# Patient Record
Sex: MALE | Race: WHITE | URBAN - METROPOLITAN AREA
[De-identification: names, ages, dates, MRNs, and addresses within clinical notes are randomized per-mention and may not be internally consistent; named-entity substitution may affect disease eponyms.]

---

## 2017-01-16 ENCOUNTER — APPOINTMENT (RX ONLY)
Dept: URBAN - METROPOLITAN AREA CLINIC 124 | Facility: CLINIC | Age: 82
Setting detail: DERMATOLOGY
End: 2017-01-16

## 2017-01-16 VITALS — WEIGHT: 156 LBS

## 2017-01-16 DIAGNOSIS — L29.89 OTHER PRURITUS: ICD-10-CM

## 2017-01-16 DIAGNOSIS — D485 NEOPLASM OF UNCERTAIN BEHAVIOR OF SKIN: ICD-10-CM

## 2017-01-16 DIAGNOSIS — L13.0 DERMATITIS HERPETIFORMIS: ICD-10-CM

## 2017-01-16 DIAGNOSIS — R23.1 PALLOR: ICD-10-CM

## 2017-01-16 DIAGNOSIS — L43.8 OTHER LICHEN PLANUS: ICD-10-CM

## 2017-01-16 PROBLEM — D48.5 NEOPLASM OF UNCERTAIN BEHAVIOR OF SKIN: Status: ACTIVE | Noted: 2017-01-16

## 2017-01-16 PROBLEM — L29.8 OTHER PRURITUS: Status: ACTIVE | Noted: 2017-01-16

## 2017-01-16 PROBLEM — M12.9 ARTHROPATHY, UNSPECIFIED: Status: ACTIVE | Noted: 2017-01-16

## 2017-01-16 PROCEDURE — 11100: CPT

## 2017-01-16 PROCEDURE — ? OTHER

## 2017-01-16 PROCEDURE — ? DIAGNOSIS COMMENT

## 2017-01-16 PROCEDURE — ? BIOPSY BY SHAVE METHOD

## 2017-01-16 PROCEDURE — 99203 OFFICE O/P NEW LOW 30 MIN: CPT | Mod: 25

## 2017-01-16 PROCEDURE — ? COUNSELING

## 2017-01-16 PROCEDURE — ? INTRAMUSCULAR KENALOG

## 2017-01-16 PROCEDURE — 96372 THER/PROPH/DIAG INJ SC/IM: CPT | Mod: 59

## 2017-01-16 ASSESSMENT — LOCATION SIMPLE DESCRIPTION DERM
LOCATION SIMPLE: RIGHT KNEE
LOCATION SIMPLE: UPPER BACK
LOCATION SIMPLE: LEFT FOREARM
LOCATION SIMPLE: LEFT BUTTOCK
LOCATION SIMPLE: LEFT PRETIBIAL REGION
LOCATION SIMPLE: RIGHT FOREARM
LOCATION SIMPLE: RIGHT PRETIBIAL REGION
LOCATION SIMPLE: LEFT KNEE
LOCATION SIMPLE: LEFT ELBOW
LOCATION SIMPLE: LEFT UPPER ARM
LOCATION SIMPLE: RIGHT UPPER BACK
LOCATION SIMPLE: RIGHT UPPER ARM
LOCATION SIMPLE: LEFT THIGH
LOCATION SIMPLE: RIGHT THIGH
LOCATION SIMPLE: RIGHT ELBOW

## 2017-01-16 ASSESSMENT — LOCATION DETAILED DESCRIPTION DERM
LOCATION DETAILED: RIGHT PROXIMAL PRETIBIAL REGION
LOCATION DETAILED: RIGHT ELBOW
LOCATION DETAILED: LEFT DISTAL POSTERIOR UPPER ARM
LOCATION DETAILED: RIGHT PROXIMAL DORSAL FOREARM
LOCATION DETAILED: LEFT LATERAL DISTAL PRETIBIAL REGION
LOCATION DETAILED: LEFT ELBOW
LOCATION DETAILED: RIGHT ANTERIOR PROXIMAL THIGH
LOCATION DETAILED: LEFT PROXIMAL DORSAL FOREARM
LOCATION DETAILED: LEFT ANTERIOR PROXIMAL THIGH
LOCATION DETAILED: LEFT KNEE
LOCATION DETAILED: RIGHT KNEE
LOCATION DETAILED: LEFT PROXIMAL PRETIBIAL REGION
LOCATION DETAILED: RIGHT SUPERIOR UPPER BACK
LOCATION DETAILED: RIGHT DISTAL POSTERIOR UPPER ARM
LOCATION DETAILED: LEFT BUTTOCK
LOCATION DETAILED: SUPERIOR THORACIC SPINE

## 2017-01-16 ASSESSMENT — LOCATION ZONE DERM
LOCATION ZONE: TRUNK
LOCATION ZONE: ARM
LOCATION ZONE: LEG

## 2017-01-16 NOTE — PROCEDURE: BIOPSY BY SHAVE METHOD
Detail Level: Simple
Biopsy Method: Double edge Personna blades
Biopsy Type: H and E
Size Of Lesion In Cm: 3
Render Post-Care Instructions In Note?: no
Hemostasis: Aluminum Chloride
Anesthesia Type: 1% lidocaine with 1:100,000 epinephrine and a 1:10 solution of 8.4% sodium bicarbonate
Wound Care: Vaseline
Anesthesia Volume In Cc (Will Not Render If 0): 1.5
Cryotherapy Text: The wound bed was treated with cryotherapy after the biopsy was performed.
Post-Care Instructions: I reviewed with the patient in detail post-care instructions. Patient is to keep the biopsy site dry overnight, and then apply bacitracin twice daily until healed. Patient may apply hydrogen peroxide soaks to remove any crusting.
Type Of Destruction Used: Curettage
Consent: Written consent was obtained and risks were reviewed including but not limited to scarring, infection, bleeding, scabbing, incomplete removal, nerve damage and allergy to anesthesia.
Lab Facility: [address] [facility]
Dressing: pressure dressing with telfa
Electrodesiccation And Curettage Text: The wound bed was treated with electrodesiccation and curettage after the biopsy was performed.
Path Notes (To The Dermatopathologist): Piece of larger lesion
Curettage Text: The wound bed was treated with curettage after the biopsy was performed.
Billing Type: [employer]
X Size Of Lesion In Cm: 2.7
Electrodesiccation Text: The wound bed was treated with electrodesiccation after the biopsy was performed.
Lab: [facility]
Notification Instructions: Patient will be notified of biopsy results. However, patient instructed to call the office if not contacted within 2 weeks.
Silver Nitrate Text: The wound bed was treated with silver nitrate after the biopsy was performed.
Additional Anesthesia Volume In Cc (Will Not Render If 0): 0

## 2017-01-16 NOTE — HPI: RASH (LICHEN PLANUS)
How Severe Is It?: moderate
Is This A New Presentation, Or A Follow-Up?: Rash
Additional History: Patient has been treated with IMK in the past as well as betamethasone cream and mometasone cream. He stated the topicals helped very minimally.

## 2017-01-16 NOTE — PROCEDURE: OTHER
Note Text (......Xxx Chief Complaint.): This diagnosis correlates with the
Detail Level: Generalized
Other (Free Text): -patient currently on dapsone therapy 100mg once daily \\n-maintains a gluten free diet \\n-chronic itching on elbows and knees\\n-managed by Dr. Primo Hunt in [facility], [address] [place] [address] Way is being monitored closely with blood work every 6-7 weeks\\n-will send records request
Other (Free Text): -discussed phototherapy vs IMK vs ILK vs topical treatments \\n-patient has treated with IMK in the past, q6 months \\n-discussed continuing IMK injects and treating more frequently, q6 weeks\\n-will treat with IMK 40mg/cc today and follow up in 6 weeks for an additional injection if needed \\n-photos taken\\n-discussed with patient risks, benefits, and adverse effects \\n-all questions answered

## 2017-01-16 NOTE — PROCEDURE: INTRAMUSCULAR KENALOG
Expiration Date (Optional): 04/2018
Ndc# (Optional): 0340-6744-96
Lot # (Optional): [id number]
Consent: The risks of atrophy were reviewed with the patient.
Concentration (Mg/Ml): 40.0
Total Volume (Ccs): 1
Kenalog Preparation: kenalog
Detail Level: Simple
Administered By (Optional): Edna Shoemaker

## 2017-02-27 ENCOUNTER — APPOINTMENT (RX ONLY)
Dept: URBAN - METROPOLITAN AREA CLINIC 124 | Facility: CLINIC | Age: 82
Setting detail: DERMATOLOGY
End: 2017-02-27

## 2017-02-27 DIAGNOSIS — L13.0 DERMATITIS HERPETIFORMIS: ICD-10-CM | Status: STABLE

## 2017-02-27 DIAGNOSIS — L81.4 OTHER MELANIN HYPERPIGMENTATION: ICD-10-CM

## 2017-02-27 DIAGNOSIS — S31000A OPEN WOUND(S) (MULTIPLE) OF UNSPECIFIED SITE(S), WITHOUT MENTION OF COMPLICATION: ICD-10-CM

## 2017-02-27 DIAGNOSIS — L29.89 OTHER PRURITUS: ICD-10-CM

## 2017-02-27 DIAGNOSIS — L43.8 OTHER LICHEN PLANUS: ICD-10-CM

## 2017-02-27 PROBLEM — L29.8 OTHER PRURITUS: Status: ACTIVE | Noted: 2017-02-27

## 2017-02-27 PROBLEM — S51.812A LACERATION WITHOUT FOREIGN BODY OF LEFT FOREARM, INITIAL ENCOUNTER: Status: ACTIVE | Noted: 2017-02-27

## 2017-02-27 PROCEDURE — ? INTRAMUSCULAR KENALOG

## 2017-02-27 PROCEDURE — 96372 THER/PROPH/DIAG INJ SC/IM: CPT

## 2017-02-27 PROCEDURE — ? OTHER

## 2017-02-27 PROCEDURE — ? DIAGNOSIS COMMENT

## 2017-02-27 PROCEDURE — 99214 OFFICE O/P EST MOD 30 MIN: CPT | Mod: 25

## 2017-02-27 PROCEDURE — ? DRESSING CHANGE

## 2017-02-27 PROCEDURE — ? TREATMENT REGIMEN

## 2017-02-27 PROCEDURE — ? COUNSELING

## 2017-02-27 ASSESSMENT — LOCATION SIMPLE DESCRIPTION DERM
LOCATION SIMPLE: LEFT FOREARM
LOCATION SIMPLE: RIGHT THIGH
LOCATION SIMPLE: LEFT CHEEK
LOCATION SIMPLE: RIGHT FOREARM
LOCATION SIMPLE: RIGHT BUTTOCK
LOCATION SIMPLE: UPPER BACK
LOCATION SIMPLE: LEFT PRETIBIAL REGION
LOCATION SIMPLE: RIGHT UPPER ARM
LOCATION SIMPLE: LEFT UPPER ARM

## 2017-02-27 ASSESSMENT — LOCATION DETAILED DESCRIPTION DERM
LOCATION DETAILED: RIGHT DISTAL POSTERIOR UPPER ARM
LOCATION DETAILED: SUPERIOR THORACIC SPINE
LOCATION DETAILED: RIGHT ANTERIOR PROXIMAL THIGH
LOCATION DETAILED: RIGHT PROXIMAL DORSAL FOREARM
LOCATION DETAILED: LEFT LATERAL DISTAL PRETIBIAL REGION
LOCATION DETAILED: LEFT PROXIMAL DORSAL FOREARM
LOCATION DETAILED: LEFT INFERIOR CENTRAL MALAR CHEEK
LOCATION DETAILED: RIGHT BUTTOCK
LOCATION DETAILED: LEFT DISTAL POSTERIOR UPPER ARM

## 2017-02-27 ASSESSMENT — LOCATION ZONE DERM
LOCATION ZONE: LEG
LOCATION ZONE: TRUNK
LOCATION ZONE: FACE
LOCATION ZONE: ARM

## 2017-02-27 NOTE — PROCEDURE: INTRAMUSCULAR KENALOG
Total Volume (Ccs): 1
Consent: NEUROPSYCHIATRIC REDUCED\\nEuphoria, psychological dependence, mood swings, depression, insomnia, personality changes, psychosis, \\nseizures, aggravation of schizophrenia and increased intracranial pressure with papilloedema in children. \\nANTI-INFLAMMATORY AND IMMUNO-SUPPRESSIVE EFFECTS  \\nIncreased susceptibility and severity of infections with suppression of clinical symptoms and signs, \\nrecurrence of dormant tuberculosis, opportunistic infections and may suppress reactions to skin tests. \\nGASTRO-INTESTINAL \\nDyspepsia, peptic ulceration with perforation and haemorrhage, abdominal distention, oesophageal \\nulceration and candidiasis, perforation of bowel and acute pancreatitis. \\nSmall, reversible rises in ALT and AST have been observed following corticosteroid administration. \\nFLUID AND ELECTROLYTE BALANCE \\nSodium and water retention, potassium loss, hypokalaemic alkalosis, hypotension and congestive heart \\nfailure in susceptible patients. \\nDERMATOLOGICAL \\nImpaired healing, thin fragile skin, skin atrophy, bruising, striae, acne, telangiectasia, petechiae and \\necchymosis. \\nENDOCRINE/METABOLIC \\nSuppression of the HPA axis, growth suppression in infancy, childhood and adolescence, cushingoid \\nfacial features, weight gain, hirsutism, impaired carbohydrate tolerance with increased requirements for \\nantidiabetic therapy, menstrual irregularities and amenorrhoea, negative nitrogen and calcium balance and \\nincreased appetite. \\nMUSCULOSKELETAL \\nMuscle weakness, proximal myopathy, osteoporosis, vertebral and long bone fractures, avascular \\nosteonecrosis, tendon rupture and aseptic necrosis. \\nOPHTHALMIC \\nCataracts with possible damage to the optic nerve, increased intra-ocular pressure and glaucoma, \\npapilloedema, corneal or scleral thinning, exophthalmos and exacerbation of ophthalmic viral or fungal \\ndisease. \\nGENERAL  \\nHypersensitivity including anaphylaxis, nausea, vertigo, thromboembolism and leucocytosis   \\nGENERAL EFFECTS ASSOCIATED WITH PARENTERAL CORTICOSTEROID THERAPY  \\nAllergic or anaphylactic reactions, cutaneous and subcutaneous atrophy, sterile abscess, post injection flare \\nfollowing intra-articular use, hypo- or hyper-pigmentation.
Concentration (Mg/Ml): 40.0
Lot # (Optional): [id number]
Kenalog Preparation: kenalog
Administered By (Optional): Leanna GASTELUM
Expiration Date (Optional): [id number]
Detail Level: Simple

## 2017-02-27 NOTE — PROCEDURE: OTHER
Note Text (......Xxx Chief Complaint.): This diagnosis correlates with the
Detail Level: Generalized
Other (Free Text): -photos taken\\n-discussed with patient risks, benefits, and adverse effects \\n-all questions answered

## 2017-02-27 NOTE — PROCEDURE: DRESSING CHANGE
Wound Evaluated By: Sabrina Portillo, SHAHIDAP
Add 03017 Cpt? (Important Note: In 2017 The Use Of 56980 Is Being Tracked By Cms To Determine Future Global Period Reimbursement For Global Periods): no
Detail Level: Detailed

## 2017-02-27 NOTE — PROCEDURE: DIAGNOSIS COMMENT
Detail Level: Zone
Comment: Biopsy proven 12/7/16 (outside path)\\nPatient had an excellent response to the IMK 40mg that we injected 6 weeks ago. Discussed that two injections, approximately 6 week apart is ideal, given his [place] presentation. He was instructed to follow-up with his providers up [facility] for reevaluation in 12 weeks. We reviewed that the lesions will burnout and that he will not need IMK routinely, but rather for flares. All questions answered.
Comment: biopsy proven DH\\nHe is currently on 100mg of Dapsone\\nThis is managed by his dermatologist up [place]\\nDiscussed that with compliance of a GFD, he might be able to taper the dose of Dapsone.

## 2017-02-27 NOTE — PROCEDURE: TREATMENT REGIMEN
Continue Regimen: Fluocinonide cream 0.05% twice daily for 2 weeks, once daily for 1 week as needed when fearing to affected areas
Detail Level: Zone
Discontinue Regimen: Neosporin ointment
Initiate Treatment: Hydrogen peroxide soak once daily to his left forearm \\nVaseline
Continue Regimen: Dapsone 100mg as directed by dermatologist meme Grijalva

## 2018-01-29 ENCOUNTER — APPOINTMENT (RX ONLY)
Dept: URBAN - METROPOLITAN AREA CLINIC 124 | Facility: CLINIC | Age: 83
Setting detail: DERMATOLOGY
End: 2018-01-29

## 2018-01-29 DIAGNOSIS — K42.9 UMBILICAL HERNIA WITHOUT OBSTRUCTION OR GANGRENE: ICD-10-CM

## 2018-01-29 DIAGNOSIS — L43.8 OTHER LICHEN PLANUS: ICD-10-CM | Status: STABLE

## 2018-01-29 DIAGNOSIS — L29.89 OTHER PRURITUS: ICD-10-CM

## 2018-01-29 PROBLEM — L29.8 OTHER PRURITUS: Status: ACTIVE | Noted: 2018-01-29

## 2018-01-29 PROCEDURE — ? PRESCRIPTION

## 2018-01-29 PROCEDURE — ? INTRAMUSCULAR KENALOG

## 2018-01-29 PROCEDURE — 96372 THER/PROPH/DIAG INJ SC/IM: CPT

## 2018-01-29 PROCEDURE — 99214 OFFICE O/P EST MOD 30 MIN: CPT | Mod: 25

## 2018-01-29 PROCEDURE — ? COUNSELING

## 2018-01-29 PROCEDURE — ? PATIENT SPECIFIC COUNSELING

## 2018-01-29 PROCEDURE — ? TREATMENT REGIMEN

## 2018-01-29 RX ORDER — FLUOCINONIDE 1 MG/G
CREAM TOPICAL
Qty: 1 | Refills: 1 | Status: ERX | COMMUNITY
Start: 2018-01-29

## 2018-01-29 RX ADMIN — FLUOCINONIDE: 1 CREAM TOPICAL at 19:41

## 2018-01-29 ASSESSMENT — LOCATION SIMPLE DESCRIPTION DERM
LOCATION SIMPLE: UPPER BACK
LOCATION SIMPLE: RIGHT FOREARM
LOCATION SIMPLE: RIGHT BUTTOCK
LOCATION SIMPLE: ABDOMEN
LOCATION SIMPLE: LEFT FOREARM
LOCATION SIMPLE: LEFT PRETIBIAL REGION
LOCATION SIMPLE: RIGHT UPPER ARM
LOCATION SIMPLE: LEFT UPPER ARM
LOCATION SIMPLE: RIGHT THIGH

## 2018-01-29 ASSESSMENT — LOCATION DETAILED DESCRIPTION DERM
LOCATION DETAILED: LEFT DISTAL POSTERIOR UPPER ARM
LOCATION DETAILED: RIGHT BUTTOCK
LOCATION DETAILED: RIGHT DISTAL POSTERIOR UPPER ARM
LOCATION DETAILED: RIGHT ANTERIOR PROXIMAL THIGH
LOCATION DETAILED: RIGHT PROXIMAL DORSAL FOREARM
LOCATION DETAILED: SUPERIOR THORACIC SPINE
LOCATION DETAILED: UMBILICUS
LOCATION DETAILED: LEFT PROXIMAL DORSAL FOREARM
LOCATION DETAILED: LEFT LATERAL DISTAL PRETIBIAL REGION

## 2018-01-29 ASSESSMENT — LOCATION ZONE DERM
LOCATION ZONE: ARM
LOCATION ZONE: LEG
LOCATION ZONE: TRUNK

## 2018-01-29 ASSESSMENT — SEVERITY ASSESSMENT: SEVERITY: ALMOST CLEAR

## 2018-01-29 NOTE — PROCEDURE: TREATMENT REGIMEN
Detail Level: Zone
Continue Regimen: Fluocinonide cream 0.1% twice daily for 2 weeks, once daily for 1 week as needed when flaring to affected areas

## 2018-01-29 NOTE — PROCEDURE: INTRAMUSCULAR KENALOG
Kenalog Preparation: kenalog
Detail Level: Simple
Concentration (Mg/Ml): 40.0
Lot # (Optional): [id number]
Total Volume (Ccs): 1
Expiration Date (Optional): [id number]
Administered By (Optional): Leanna GASTELUM
Consent: NEUROPSYCHIATRIC REDUCED\\nEuphoria, psychological dependence, mood swings, depression, insomnia, personality changes, psychosis, \\nseizures, aggravation of schizophrenia and increased intracranial pressure with papilloedema in children. \\nANTI-INFLAMMATORY AND IMMUNO-SUPPRESSIVE EFFECTS  \\nIncreased susceptibility and severity of infections with suppression of clinical symptoms and signs, \\nrecurrence of dormant tuberculosis, opportunistic infections and may suppress reactions to skin tests. \\nGASTRO-INTESTINAL \\nDyspepsia, peptic ulceration with perforation and haemorrhage, abdominal distention, oesophageal \\nulceration and candidiasis, perforation of bowel and acute pancreatitis. \\nSmall, reversible rises in ALT and AST have been observed following corticosteroid administration. \\nFLUID AND ELECTROLYTE BALANCE \\nSodium and water retention, potassium loss, hypokalaemic alkalosis, hypotension and congestive heart \\nfailure in susceptible patients. \\nDERMATOLOGICAL \\nImpaired healing, thin fragile skin, skin atrophy, bruising, striae, acne, telangiectasia, petechiae and \\necchymosis. \\nENDOCRINE/METABOLIC \\nSuppression of the HPA axis, growth suppression in infancy, childhood and adolescence, cushingoid \\nfacial features, weight gain, hirsutism, impaired carbohydrate tolerance with increased requirements for \\nantidiabetic therapy, menstrual irregularities and amenorrhoea, negative nitrogen and calcium balance and \\nincreased appetite. \\nMUSCULOSKELETAL \\nMuscle weakness, proximal myopathy, osteoporosis, vertebral and long bone fractures, avascular \\nosteonecrosis, tendon rupture and aseptic necrosis. \\nOPHTHALMIC \\nCataracts with possible damage to the optic nerve, increased intra-ocular pressure and glaucoma, \\npapilloedema, corneal or scleral thinning, exophthalmos and exacerbation of ophthalmic viral or fungal \\ndisease. \\nGENERAL  \\nHypersensitivity including anaphylaxis, nausea, vertigo, thromboembolism and leucocytosis   \\nGENERAL EFFECTS ASSOCIATED WITH PARENTERAL CORTICOSTEROID THERAPY  \\nAllergic or anaphylactic reactions, cutaneous and subcutaneous atrophy, sterile abscess, post injection flare \\nfollowing intra-articular use, hypo- or hyper-pigmentation.

## 2018-01-29 NOTE — PROCEDURE: MIPS QUALITY
Quality 402: Tobacco Use And Help With Quitting Among Adolescents: Tobacco Screening OR Tobacco Cessation Intervention not Performed Reason Not Otherwise Specified
Quality 47: Advance Care Plan: Advance Care Planning discussed and documented in the medical record; patient did not wish or was not able to name a surrogate decision maker or provide an advance care plan.
Quality 110: Preventive Care And Screening: Influenza Immunization: Influenza Immunization previously received during influenza season
Quality 131: Pain Assessment And Follow-Up: Pain assessment using a standardized tool is documented as negative, no follow-up plan required
Quality 130: Documentation Of Current Medications In The Medical Record: Current Medications Documented
Quality 111:Pneumonia Vaccination Status For Older Adults: Pneumococcal Vaccination Previously Received
Detail Level: Detailed

## 2018-01-29 NOTE — PROCEDURE: PATIENT SPECIFIC COUNSELING
-patient has a negative colonoscopy 6 months ago\\n\\n-patient had a negative CT chest scan 1 year ago
Detail Level: Detailed

## 2020-09-01 NOTE — PROCEDURE: COUNSELING
Detail Level: Simple
Individual Follow-Up Form    9/1/2020    Quit Date:     Clinical Status of Patient: Outpatient    Length of Service: 45 minutes    Continuing Medication: no    Other Medications:      Target Symptoms: Withdrawal and medication side effects. The following were  rated moderate (3) to severe (4) on TCRS:  · Moderate (3): none  · Severe (4): none    Comments: completion of TCRS (Tobacco Cessation Rating Scale) reviewed strategies, cues, and triggers. Introduced the negative impact of tobacco on health, the health advantages of discontinuing the use of tobacco, time line improved health changes after a quit, withdrawal issues to expect from nicotine and habit, and ways to achieve the goal of a quit. Pt brought in smoking diary. Cut back from 47 Wednesday of last week to 26 yesterday.  Has not started gum or chantix.  Discussed starting gum to help delay urges.  Plan to start Chantix when can go two weeks in a row of a pack or less.  Did put into place three changes since our first apt last week. 1. Only had a cigarette on way when driving round trip, 2. Kept cigarettes in truck instead of on person 3. Did not smoke back to back cigarettes.  Visualized daughter in car when driving to help smoke less.  Still smoking heavily in car.  Goal of less than 26 per day this coming week.      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    
Detail Level: Generalized
Detail Level: Detailed